# Patient Record
Sex: MALE | Race: BLACK OR AFRICAN AMERICAN
[De-identification: names, ages, dates, MRNs, and addresses within clinical notes are randomized per-mention and may not be internally consistent; named-entity substitution may affect disease eponyms.]

---

## 2020-02-11 ENCOUNTER — HOSPITAL ENCOUNTER (OUTPATIENT)
Dept: HOSPITAL 53 - M RAD | Age: 55
End: 2020-02-11
Attending: SURGERY
Payer: COMMERCIAL

## 2020-02-11 DIAGNOSIS — M79.605: Primary | ICD-10-CM

## 2020-02-11 NOTE — REP
Clinical:  History of fixation with recent trauma.

 

Technique:  AP, lateral, bilateral oblique views of the left knee.

 

Findings:

Evidence for prior fixation with intramedullary rods through the visualized

tibia.  Degenerative changes to the knee are suggested without obvious acute

fracture or dislocation.  Lateral view suggests soft tissue swelling and possible

effusion.  Subtle injury to the patella cannot definitively be excluded and

should be correlated clinically.

 

Impression:

Anterior swelling and possible small effusion.

Degenerative changes are appreciated.

No definite acute injury although subtle trauma to the patella cannot be excluded

and requires physical correlation.

 

 

Electronically Signed by

Momo Martell MD 02/11/2020 09:20 A

## 2020-02-11 NOTE — REP
Clinical:  Prior fixation/trauma with acute injury and pain.

 

Technique:  AP and lateral views of the left tibia / fibula.

 

Findings:

Evidence of prior healed fractures involving the mid tibial and fibular shafts

including two intramedullary fixation wires through the tibia.  No obvious acute

fracture or dislocation.

 

Impression:

Post traumatic and degenerative changes.

No obvious acute fracture or dislocation appreciated.

 

 

Electronically Signed by

Momo Martell MD 02/11/2020 09:22 A